# Patient Record
Sex: MALE | Race: WHITE
[De-identification: names, ages, dates, MRNs, and addresses within clinical notes are randomized per-mention and may not be internally consistent; named-entity substitution may affect disease eponyms.]

---

## 2022-06-07 ENCOUNTER — HOSPITAL ENCOUNTER (OUTPATIENT)
Dept: HOSPITAL 75 - CARD | Age: 47
End: 2022-06-07
Attending: FAMILY MEDICINE
Payer: COMMERCIAL

## 2022-06-07 DIAGNOSIS — R07.89: ICD-10-CM

## 2022-06-07 DIAGNOSIS — R55: Primary | ICD-10-CM

## 2022-06-07 DIAGNOSIS — R00.1: ICD-10-CM

## 2022-06-07 PROCEDURE — 93306 TTE W/DOPPLER COMPLETE: CPT

## 2022-06-07 PROCEDURE — 71046 X-RAY EXAM CHEST 2 VIEWS: CPT

## 2022-06-07 NOTE — DIAGNOSTIC IMAGING REPORT
Indication: Right chest wall pain



PA and lateral views of the chest are obtained.



COMPARISON: No previous study is available for comparison at this

time.



FINDINGS: Heart size and pulmonary vasculature are within normal

limits, and the lungs are clear, bilaterally.  



IMPRESSION: Unremarkable chest.   



Dictated by: 



  Dictated on workstation # [id number]